# Patient Record
Sex: MALE | Race: OTHER | HISPANIC OR LATINO | URBAN - METROPOLITAN AREA
[De-identification: names, ages, dates, MRNs, and addresses within clinical notes are randomized per-mention and may not be internally consistent; named-entity substitution may affect disease eponyms.]

---

## 2020-06-14 ENCOUNTER — EMERGENCY (EMERGENCY)
Facility: HOSPITAL | Age: 35
LOS: 1 days | Discharge: HOME | End: 2020-06-14
Attending: EMERGENCY MEDICINE | Admitting: EMERGENCY MEDICINE
Payer: COMMERCIAL

## 2020-06-14 VITALS
TEMPERATURE: 98 F | DIASTOLIC BLOOD PRESSURE: 79 MMHG | HEIGHT: 69 IN | HEART RATE: 63 BPM | SYSTOLIC BLOOD PRESSURE: 109 MMHG | RESPIRATION RATE: 16 BRPM | OXYGEN SATURATION: 98 % | WEIGHT: 165.35 LBS

## 2020-06-14 VITALS
TEMPERATURE: 98 F | RESPIRATION RATE: 16 BRPM | OXYGEN SATURATION: 98 % | HEART RATE: 68 BPM | SYSTOLIC BLOOD PRESSURE: 121 MMHG | DIASTOLIC BLOOD PRESSURE: 72 MMHG

## 2020-06-14 PROCEDURE — 99284 EMERGENCY DEPT VISIT MOD MDM: CPT

## 2020-06-14 NOTE — ED ADULT NURSE REASSESSMENT NOTE - NS ED NURSE REASSESS COMMENT FT1
Transfer of care from GAMALIEL Saul to myself. Pt sleeping in stretcher close to nursing station. Side rails up and safety measures maintained. Breathing spontaneous and unlabored. Pending sobriety.

## 2020-06-14 NOTE — ED PROVIDER NOTE - OBJECTIVE STATEMENT
35 y/o M Pt BIB by EMS for ETOH intoxication.  Admits to heavy ETOH use today, no other complaints.  Placed in stretcher and quickly falls asleep.  Unable to cooperate with remainder of history.

## 2020-06-14 NOTE — ED ADULT TRIAGE NOTE - CHIEF COMPLAINT QUOTE
Pt brought in by EMS after pt was found asleep on the sidewalk on [address]. Pt admits to drinking alcohol tonight. +slurred speech. No visible signs of trauma.

## 2020-06-14 NOTE — ED ADULT NURSE NOTE - OBJECTIVE STATEMENT
Pt BIBA after was found sleeping in street. Admitted in triage to heavy alcohol use tonight. Fell rapidly asleep upon arrival. Breathing even and unlabored, with no signs of injury.

## 2020-06-14 NOTE — ED ADULT NURSE NOTE - NSIMPLEMENTINTERV_GEN_ALL_ED
Implemented All Fall Risk Interventions:  [place] to call system. Call bell, personal items and telephone within reach. Instruct patient to call for assistance. Room bathroom lighting operational. Non-slip footwear when patient is off stretcher. Physically safe environment: no spills, clutter or unnecessary equipment. Stretcher in lowest position, wheels locked, appropriate side rails in place. Provide visual cue, wrist band, yellow gown, etc. Monitor gait and stability. Monitor for mental status changes and reorient to person, place, and time. Review medications for side effects contributing to fall risk. Reinforce activity limits and safety measures with patient and family.

## 2020-06-14 NOTE — ED PROVIDER NOTE - NSFOLLOWUPINSTRUCTIONS_ED_ALL_ED_FT
Log Out.    [facility] Care[facility]     :  [facility]             ALCOHOL INTOXICATION - AfterCare(R) Instructions(ER/ED)     Alcohol Intoxication    WHAT YOU NEED TO KNOW:    Alcohol intoxication is a harmful physical condition caused when you drink more alcohol than your body can handle. It is also called ethanol poisoning, or being drunk.    DISCHARGE INSTRUCTIONS:    Call your local emergency number (911 in the [place]) if:     You have sudden trouble breathing or chest pain.      You have a seizure.      You feel sad enough to harm yourself or others.    Call your doctor if:     You have hallucinations (you see or hear things that are not real).      You cannot stop vomiting.      You have questions or concerns about your condition or care.    Recommended alcohol limits:     Men 21 to 64 years should limit alcohol to 2 drinks a day. Do not have more than 4 drinks in 1 day or more than 14 in 1 week.      All women, and men 65 or older should limit alcohol to 1 drink in a day. Do not have more than 3 drinks in 1 day or more than 7 in 1 week. No amount of alcohol is okay during pregnancy.    Manage alcohol use:     Decrease the amount you drink. This can help prevent health problems such as brain, heart, and liver damage, high blood pressure, diabetes, and cancer. If you cannot stop completely, healthcare providers can help you set goals to decrease the amount you drink.      Plan weekly alcohol use. You will be less likely to drink more than the recommended limit if you plan ahead.      Have food when you drink alcohol. Food will prevent alcohol from getting into your system too quickly. Eat before you have your first alcohol drink.      Time your drinks carefully. Have no more than 1 drink in an hour. Have a liquid such as water, coffee, or a soft drink between alcohol drinks.      Do not drive if you have had alcohol. Make sure someone who has not been drinking can help you get home.      Do not drink alcohol if you are taking medicine. Alcohol is dangerous when you combine it with certain medicines, such as acetaminophen or blood pressure medicine. Talk to your healthcare provider about all the medicines you currently take.    For more information:     Alcoholics Anonymous  Web Address: http://www.aa.org      Substance Abuse and Mental Health Services Administration  [address]  [place] [address]  Web Address: http://www.[facility].gov      Follow up with your healthcare provider as directed: Write down your questions so you remember to ask them during your visits.        © Copyright [employer] 2020       back to top                      © Copyright [employer] 2020

## 2020-06-14 NOTE — ED ADULT TRIAGE NOTE - LOCATION:
Date of Procedure: 06/19/18


Preoperative Diagnosis: 


Severe osteoarthritis left hip


Postoperative Diagnosis: 


Severe osteoarthritis left hip


Procedure(s) Performed: 


Left total hip arthroplasty with a direct anterior approach


Implants: 


Smith and nephew Polarstem size 6 standard


Smith & Nephew R3, 3 hole acetabular shell, 52 mm


Smith & Nephew reflection 6.5 mm cancellus screw, 20 mm 2


Smith & Nephew R3, XLPE 20 acetabular liner


Smith & Nephew Oxinium femoral head 36 m, +0


All components were press-fit.


The articulation is Oxinium on polyethylene.


Anesthesia: GETA


Surgeon: Destin Edwards


Assistant #1: Gwendolyn Thomas


Estimated Blood Loss (ml): 400 (203 mL returned with Cell Saver)


Pathology: other (Femoral head)


Condition: stable


Disposition: PACU


Indications for Procedure: 


After failure of conservative treatment we discussed the surgical and 

nonsurgical treatment options at length.  Patient wishes to proceed with a 

total hip arthroplasty with a direct anterior approach.  Complications specific 

to this procedure were discussed at length, including but not limited to 

infection, leg length discrepancy, dislocation, and nerve injury.  Patient is 

aware of all these complications and informed consent was obtained


Operative Findings: 


The operative findings are consistent with severe osteoarthritis of the left hip


Description of Procedure: 


Patient was seen and evaluated in the preoperative area, consent was reviewed, 

and the surgical site was marked with a skin marker.  Patient was then brought 

to the operating room and given prophylactic antibiotics intravenously.  1 g of 

Tranexamic acid was also given.  A general anesthetic was administered by the 

anesthesia department.   The patient was then placed on the [place] table with the 

bony prominences well-padded.  The hip area was then prepped and draped in 

usual sterile fashion.





A universal timeout was then performed, which confirmed the patient's name, 

surgical site, ALLERGIES, and procedure being performed.  Next the incision 

site was located at 1 cm distal and 1 cm lateral to the anterior superior iliac 

spine.  The skin and subcutaneous tissues were sharply incised.  Incision was 

carefully dissected down to the fascia overlying the tensor fascia obdulia muscle.

  This fascia was then incised in line with the incision.  Next, using blunt 

finger dissection, the tensor fascia obdulia muscle was dissected off its 

investing fascia.  The muscle was then carefully retracted laterally with a 

cobra retractor over the lateral neck of the femur.  Next, the circumflex 

vessels were identified and cauterized using the AquaMantis device.  The 

anterior hip capsule was then exposed.  The capsule was then opened and an 

inverted T fashion.  Cobra retractors were then placed intracapsularly.  The 

proximal femur was then visualized.





The femoral neck was then osteotomized appropriate level above the lesser 

trochanter.  Small amount of traction was placed with the [place] table.  A small 

wedge of bone was then removed from the remaining femoral head.  Next, using a 

corkscrew femoral head was easily removed from the acetabulum.  On gross visual 

inspection, the femoral head had complete loss of articular cartilage in 

multiple periarticular osteophytes.  Attention was then turned to the 

acetabulum.





the acetabulum was exposed and any remaining labrum was excised.  Sequential 

reaming of the acetabulum was performed using fluoroscopic guidance.  When the 

appropriate size was reached, a trial was then placed.  The position and fit of 

the trial was checked with fluoroscopy.  The trial was then removed.  Then, 

using fluoroscopic guidance, the final implant was impacted at 20 of 

anteversion and 40 of abduction, and fully seated in the acetabulum.  2 screws 

were then placed in the acetabulum.  Again fluoroscopy was used to check 

position of the screws.  Next, the liner was then impacted, with a 20 elevated 

liner located in the anterior superior quadrant.  Component locking was 

confirmed.





Attention was then directed to the femur.  With the aid of the [place] table, the 

femur was externally rotated to approximately 130, extended, and abducted 

under the opposite leg.  A side hook was then placed under the proximal femur, 

and the side hook elevator was used to elevate the proximal femur.  Retractors 

were then placed.  A capsular release was performed, as well as a release of 

the conjoined tendon, which afforded excellent visualization of the proximal 

femur.  Next, a box osteotome was used to lateralize the proximal femur.  A 

 was then used to locate the femoral canal.  Sequential broaching 

was then performed with appropriate size which afforded excellent fixation in 

the proximal femur.  A trial was then placed with appropriate head and neck, 

and the hip was gently reduced with the aid of the [place] table.  Fluoroscopy was 

then used to check position of the components, as well as to ensure equal leg 

lengths.  The hip was then gently dislocated and the trials were then removed.  

Final implants were then impacted and the hip was again reduced.  Final 

fluoroscopic x-rays confirmed that the components were in anatomic position, as 

well as equal leg lengths.  The hip was also taken through range of motion, and 

found to be stable.





The hip was then copiously irrigated with antibiotic solution with pulsatile 

lavage.  The hip was then irrigated with Irrisept solution.  The soft tissues 

were then injected with a ropivacaine solution, which consisted of 246.25 mg of 

ropivacaine, 0.5 mg of epinephrine, 30 mg of Toradol, 80 g of clonidine, and 

48.45 mL of sterile water, for a total of 100 mL of fluid injected.  A second 

dose of 1 g of Tranexamic acid was also given.





the fascia was then closed with 2-0 strata fix suture.  The subcutaneous tissue 

was closed with 3-0 Vicryl.  The subcuticular tissue was closed with 3-0 strata 

fix suture.  The skin was then closed with Dermabond glue and a sterile silver 

dressing.  The patient was then transferred to the recovery room in stable 

condition.





The assistant  SARAI Cox was required due to the complexity of surgery, 

and the need for skilled surgical assistant for positioning, draping, exposure, 

retraction, and closure of the wound. Left arm;

## 2020-06-14 NOTE — ED PROVIDER NOTE - PATIENT PORTAL LINK FT
You can access the FollowMyHealth Patient Portal offered by [facility] by registering at the following website: [email or web address]. By joining [facility]’s FollowMyHealth portal, you will also be able to view your health information using other applications (apps) compatible with our system.

## 2020-06-17 DIAGNOSIS — F10.129 ALCOHOL ABUSE WITH INTOXICATION, UNSPECIFIED: ICD-10-CM

## 2025-05-30 NOTE — ED ADULT NURSE NOTE - BREATHING, MLM
Discharge Instructions For Pain Injections     DIET                 Resume your usual diet. If you are nauseated, remain on clear liquids until nausea passes.    ACTIVITY         Do not drive with any numbness or weakness in your legs.  If you received Valium today, no driving for 24 hours  We recommend that you rest and relax today.  No strenuous activity, heavy lifting or weight training for 3 days.  Please discuss resuming physical therapy appointments with Dr. Lindsey.    MEDICATION    Resume previous all medications. Blood thinners may be resumed tomorrow.  If you received an epidural steroid injection you may develop a severe headache after treatment. Should you get a headache, lie down, rest and drink extra fluids (caffeine if possible) and take a mild pain reliever.  Please call Dr. Lindsey if your headache does not resolve.       WOUND CARE  You may experience facial flushing, restlessness and or sleeplessness.These are common side effects of the steroid medication and will  resolve within 4 days.   Vaginal bleeding has also been associated with steroid injections in pre and postmenopausal women.     If bleeding persists more than 5 days please contact your OB/GYNE or Primary Care Physician.  It is not unusual to have increased soreness at the site of your injection.  Apply ice to the injection site for 20 minutes every 4 hours as needed for discomfort.  Do not use heating pads for the first 24 hours.  Leave the Band-Aid on for the rest of today.  You may shower today. No bath tubs or swimming for 3 days.  Wash your hands with soap and water before and after touching your injection site.    Please contact Dr. Lindsey with any questions, concerns or any of the following:  Redness, drainage, or swelling at the injection site, fever above 101.0  Persistent nausea/vomiting or stiff neck  CALL 911 FOR ANY CHEST PAIN OR DIFFICULTLY BREATHING  Office Number [phone]     For hip and knee injections:  Resume  your usual diet and activity.  Call for temperature above 101.0, redness, drainage or swelling at the injection site.                 Dr. Lindsey's office will contact you to schedule the next appointment as requested by Dr. Lindsey.   If you have not been contacted by the office within two weeks, please contact them at [phone].    Call the office to provide a 24 hour notice when cancelling any appointments at [phone].  Do not cancel [facility] appointments using My Chart.     Spontaneous, unlabored and symmetrical